# Patient Record
Sex: FEMALE | Race: WHITE
[De-identification: names, ages, dates, MRNs, and addresses within clinical notes are randomized per-mention and may not be internally consistent; named-entity substitution may affect disease eponyms.]

---

## 2017-01-13 ENCOUNTER — HOSPITAL ENCOUNTER (OUTPATIENT)
Age: 67
Discharge: ANOTHER HOSPITAL | End: 2017-01-13
Payer: MEDICARE

## 2017-01-13 DIAGNOSIS — M54.9: Primary | ICD-10-CM

## 2017-03-06 ENCOUNTER — HOSPITAL ENCOUNTER (OUTPATIENT)
Age: 67
Discharge: HOME | End: 2017-03-06
Payer: MEDICARE

## 2017-03-06 DIAGNOSIS — J44.9: ICD-10-CM

## 2017-03-06 DIAGNOSIS — E78.5: Primary | ICD-10-CM

## 2017-03-06 DIAGNOSIS — I25.10: ICD-10-CM

## 2017-04-21 ENCOUNTER — HOSPITAL ENCOUNTER (OUTPATIENT)
Age: 67
Discharge: HOME | End: 2017-04-21
Payer: MEDICARE

## 2017-04-21 DIAGNOSIS — M16.12: Primary | ICD-10-CM

## 2017-06-06 ENCOUNTER — HOSPITAL ENCOUNTER (OUTPATIENT)
Dept: HOSPITAL 76 - LAB.F | Age: 67
Discharge: HOME | End: 2017-06-06
Attending: NURSE PRACTITIONER
Payer: MEDICARE

## 2017-06-06 DIAGNOSIS — R74.8: Primary | ICD-10-CM

## 2017-06-06 LAB
BASOPHILS NFR BLD AUTO: 0.1 10^3/UL (ref 0–0.1)
BASOPHILS NFR BLD AUTO: 0.8 %
BILIRUB BLD-MCNC: 0.5 MG/DL (ref 0.2–1)
BILIRUB DIRECT SERPL-MCNC: 0.1 MG/DL (ref 0.1–0.5)
EOSINOPHIL # BLD AUTO: 0.2 10^3/UL (ref 0–0.7)
EOSINOPHIL NFR BLD AUTO: 2.5 %
ERYTHROCYTE [DISTWIDTH] IN BLOOD BY AUTOMATED COUNT: 13.9 % (ref 12–15)
GLOBULIN SER-MCNC: 3 G/DL (ref 2.1–4.2)
HCT VFR BLD AUTO: 46.4 % (ref 37–47)
HGB UR QL STRIP: 15.7 G/DL (ref 12–16)
LYMPHOCYTES # SPEC AUTO: 2.3 10^3/UL (ref 1.5–3.5)
LYMPHOCYTES NFR BLD AUTO: 28.2 %
MCH RBC QN AUTO: 31.3 PG (ref 27–31)
MCHC RBC AUTO-ENTMCNC: 33.9 G/DL (ref 32–36)
MCV RBC AUTO: 92.1 FL (ref 81–99)
MONOCYTES # BLD AUTO: 0.6 10^3/UL (ref 0–1)
MONOCYTES NFR BLD AUTO: 7.1 %
NEUTROPHILS # BLD AUTO: 5 10^3/UL (ref 1.5–6.6)
NEUTROPHILS # SNV AUTO: 8.1 X10^3/UL (ref 4.8–10.8)
NEUTROPHILS NFR BLD AUTO: 61.4 %
NRBC # BLD AUTO: 0 /100WBC
PDW BLD AUTO: 8.7 FL (ref 7.9–10.8)
PROT SPEC-MCNC: 6.6 G/DL (ref 6.7–8.2)
RBC MAR: 5.04 10^6/UL (ref 4.2–5.4)
WBC # BLD: 8.1 X10^3/UL

## 2017-06-06 PROCEDURE — 80076 HEPATIC FUNCTION PANEL: CPT

## 2017-06-06 PROCEDURE — 36415 COLL VENOUS BLD VENIPUNCTURE: CPT

## 2017-06-06 PROCEDURE — 85025 COMPLETE CBC W/AUTO DIFF WBC: CPT

## 2017-08-14 ENCOUNTER — HOSPITAL ENCOUNTER (OUTPATIENT)
Dept: HOSPITAL 76 - LAB.S | Age: 67
Discharge: HOME | End: 2017-08-14
Attending: NURSE PRACTITIONER
Payer: MEDICARE

## 2017-08-14 DIAGNOSIS — R74.8: Primary | ICD-10-CM

## 2017-08-14 LAB
BILIRUB BLD-MCNC: 0.7 MG/DL (ref 0.2–1)
BILIRUB DIRECT SERPL-MCNC: 0.1 MG/DL (ref 0.1–0.5)
GLOBULIN SER-MCNC: 3.1 G/DL (ref 2.1–4.2)
PROT SPEC-MCNC: 6.7 G/DL (ref 6.7–8.2)

## 2017-08-14 PROCEDURE — 36415 COLL VENOUS BLD VENIPUNCTURE: CPT

## 2017-08-14 PROCEDURE — 80076 HEPATIC FUNCTION PANEL: CPT

## 2017-10-10 ENCOUNTER — HOSPITAL ENCOUNTER (OUTPATIENT)
Dept: HOSPITAL 76 - LAB.F | Age: 67
Discharge: HOME | End: 2017-10-10
Attending: NURSE PRACTITIONER
Payer: MEDICARE

## 2017-10-10 DIAGNOSIS — R74.8: Primary | ICD-10-CM

## 2017-10-10 DIAGNOSIS — Z13.21: ICD-10-CM

## 2017-10-10 DIAGNOSIS — Z79.899: ICD-10-CM

## 2017-10-10 DIAGNOSIS — R20.2: ICD-10-CM

## 2017-10-10 DIAGNOSIS — I10: ICD-10-CM

## 2017-10-10 DIAGNOSIS — E78.5: ICD-10-CM

## 2017-10-10 LAB
ANION GAP SERPL CALCULATED.4IONS-SCNC: 5 MMOL/L (ref 6–13)
BILIRUB BLD-MCNC: 0.6 MG/DL (ref 0.2–1)
BILIRUB DIRECT SERPL-MCNC: 0.1 MG/DL (ref 0.1–0.5)
BUN SERPL-MCNC: 13 MG/DL (ref 6–20)
CALCIUM UR-MCNC: 9.8 MG/DL (ref 8.5–10.3)
CHLORIDE SERPL-SCNC: 107 MMOL/L (ref 101–111)
CO2 SERPL-SCNC: 28 MMOL/L (ref 21–32)
CREAT SERPLBLD-SCNC: 0.8 MG/DL (ref 0.4–1)
GFRSERPLBLD MDRD-ARVRAT: 72 ML/MIN/{1.73_M2} (ref 89–?)
GLOBULIN SER-MCNC: 3.1 G/DL (ref 2.1–4.2)
GLUCOSE SERPL-MCNC: 109 MG/DL (ref 70–100)
POTASSIUM SERPL-SCNC: 4 MMOL/L (ref 3.5–5)
PROT SPEC-MCNC: 6.6 G/DL (ref 6.7–8.2)
SODIUM SERPLBLD-SCNC: 140 MMOL/L (ref 135–145)

## 2017-10-10 PROCEDURE — 82607 VITAMIN B-12: CPT

## 2017-10-10 PROCEDURE — 80076 HEPATIC FUNCTION PANEL: CPT

## 2017-10-10 PROCEDURE — 80048 BASIC METABOLIC PNL TOTAL CA: CPT

## 2017-10-10 PROCEDURE — 36415 COLL VENOUS BLD VENIPUNCTURE: CPT

## 2018-01-29 ENCOUNTER — HOSPITAL ENCOUNTER (OUTPATIENT)
Dept: HOSPITAL 76 - LAB.R | Age: 68
Discharge: HOME | End: 2018-01-29
Attending: NURSE PRACTITIONER
Payer: MEDICARE

## 2018-01-29 DIAGNOSIS — Z79.891: Primary | ICD-10-CM

## 2018-01-29 PROCEDURE — 80361 OPIATES 1 OR MORE: CPT

## 2018-01-29 PROCEDURE — 81599 UNLISTED MAAA: CPT

## 2018-01-29 PROCEDURE — 80307 DRUG TEST PRSMV CHEM ANLYZR: CPT

## 2018-01-29 PROCEDURE — 80365 DRUG SCREENING OXYCODONE: CPT

## 2018-05-15 ENCOUNTER — HOSPITAL ENCOUNTER (OUTPATIENT)
Dept: HOSPITAL 76 - LAB.F | Age: 68
Discharge: HOME | End: 2018-05-15
Attending: NURSE PRACTITIONER
Payer: MEDICARE

## 2018-05-15 DIAGNOSIS — I10: Primary | ICD-10-CM

## 2018-05-15 LAB
ANION GAP SERPL CALCULATED.4IONS-SCNC: 6 MMOL/L (ref 6–13)
BUN SERPL-MCNC: 13 MG/DL (ref 6–20)
CALCIUM UR-MCNC: 9.6 MG/DL (ref 8.5–10.3)
CHLORIDE SERPL-SCNC: 107 MMOL/L (ref 101–111)
CO2 SERPL-SCNC: 27 MMOL/L (ref 21–32)
CREAT SERPLBLD-SCNC: 0.7 MG/DL (ref 0.4–1)
GFRSERPLBLD MDRD-ARVRAT: 83 ML/MIN/{1.73_M2} (ref 89–?)
GLUCOSE SERPL-MCNC: 105 MG/DL (ref 70–100)
SODIUM SERPLBLD-SCNC: 140 MMOL/L (ref 135–145)

## 2018-05-15 PROCEDURE — 80048 BASIC METABOLIC PNL TOTAL CA: CPT

## 2018-05-15 PROCEDURE — 36415 COLL VENOUS BLD VENIPUNCTURE: CPT

## 2018-08-20 ENCOUNTER — HOSPITAL ENCOUNTER (OUTPATIENT)
Dept: HOSPITAL 76 - LAB.S | Age: 68
Discharge: HOME | End: 2018-08-20
Attending: NURSE PRACTITIONER
Payer: MEDICARE

## 2018-08-20 DIAGNOSIS — I25.10: Primary | ICD-10-CM

## 2018-08-20 LAB
CHOLEST SERPL-MCNC: 149 MG/DL
HDLC SERPL-MCNC: 29 MG/DL
HDLC SERPL: 5.1 {RATIO} (ref ?–4.4)
LDLC SERPL CALC-MCNC: 90 MG/DL
LDLC/HDLC SERPL: 3.1 {RATIO} (ref ?–4.4)
VLDLC SERPL-SCNC: 30 MG/DL

## 2018-08-20 PROCEDURE — 80061 LIPID PANEL: CPT

## 2018-08-20 PROCEDURE — 36415 COLL VENOUS BLD VENIPUNCTURE: CPT

## 2018-08-20 PROCEDURE — 83721 ASSAY OF BLOOD LIPOPROTEIN: CPT

## 2018-10-08 ENCOUNTER — HOSPITAL ENCOUNTER (OUTPATIENT)
Dept: HOSPITAL 76 - LAB.R | Age: 68
End: 2018-10-08
Attending: NURSE PRACTITIONER
Payer: MEDICARE

## 2018-10-08 DIAGNOSIS — Z79.891: Primary | ICD-10-CM

## 2018-10-08 LAB
AMPHET UR QL SCN: NEGATIVE
BENZODIAZ UR QL SCN: NEGATIVE
COCAINE UR-SCNC: NEGATIVE UMOL/L
METHADONE UR QL SCN: NEGATIVE
METHAMPHET UR QL SCN: NEGATIVE
OPIATES UR QL SCN: NEGATIVE
VOLATILE DRUGS POS SERPL SCN: (no result)

## 2018-10-08 PROCEDURE — 80306 DRUG TEST PRSMV INSTRMNT: CPT

## 2019-01-14 ENCOUNTER — HOSPITAL ENCOUNTER (OUTPATIENT)
Dept: HOSPITAL 76 - DI | Age: 69
Discharge: HOME | End: 2019-01-14
Attending: NURSE PRACTITIONER
Payer: MEDICARE

## 2019-01-14 DIAGNOSIS — M54.5: ICD-10-CM

## 2019-01-14 DIAGNOSIS — M16.12: Primary | ICD-10-CM

## 2019-01-14 DIAGNOSIS — G89.29: ICD-10-CM

## 2019-01-14 DIAGNOSIS — Z97.8: ICD-10-CM

## 2019-01-15 NOTE — XRAY REPORT
Reason:  LOW BACK PAIN,CHRONIC,HIP PAIN,LEFT,PRESENCE OF OT

Procedure Date:  01/14/2019   

Accession Number:  [id number] / [id number]                    

Procedure:  XR  - Hip w/Pelvis 2-3V LT CPT Code:  

 

FULL RESULT:

 

 

EXAM:

LEFT HIP AND PELVIS RADIOGRAPHY

 

EXAM DATE: 1/14/2019 03:50 PM.

 

HISTORY: LOW BACK PAIN,CHRONIC,HIP PAIN,LEFT,PRESENCE OF OT.

 

COMPARISONS: HIP W/PELVIS 2-3V LT 04/21/2017 2:32 PM.

 

TECHNIQUE: 1 view of the pelvis and 1 view of the hip.

 

FINDINGS:

Bones: Normal. No fracture or bone lesion.

 

Joints: Stable osteoarthritis of the left hip.

 

Soft Tissues: Spinal stimulator pack overlying the right hip, stable.

IMPRESSION: Stable osteoarthritis of the left hip.

 

[facility]

## 2019-02-18 ENCOUNTER — HOSPITAL ENCOUNTER (OUTPATIENT)
Dept: HOSPITAL 76 - LAB.R | Age: 69
Discharge: HOME | End: 2019-02-18
Attending: NURSE PRACTITIONER
Payer: MEDICARE

## 2019-02-18 DIAGNOSIS — I25.10: ICD-10-CM

## 2019-02-18 DIAGNOSIS — E78.5: Primary | ICD-10-CM

## 2019-02-18 LAB
ALBUMIN DIAFP-MCNC: 3.6 G/DL (ref 3.2–5.5)
ALBUMIN/GLOB SERPL: 1.1 {RATIO} (ref 1–2.2)
ALP SERPL-CCNC: 112 IU/L (ref 42–121)
ALT SERPL W P-5'-P-CCNC: 31 IU/L (ref 10–60)
ANION GAP SERPL CALCULATED.4IONS-SCNC: 8 MMOL/L (ref 6–13)
AST SERPL W P-5'-P-CCNC: 26 IU/L (ref 10–42)
BILIRUB BLD-MCNC: 0.8 MG/DL (ref 0.2–1)
BUN SERPL-MCNC: 13 MG/DL (ref 6–20)
CALCIUM UR-MCNC: 10 MG/DL (ref 8.5–10.3)
CHLORIDE SERPL-SCNC: 108 MMOL/L (ref 101–111)
CO2 SERPL-SCNC: 27 MMOL/L (ref 21–32)
CREAT SERPLBLD-SCNC: 0.8 MG/DL (ref 0.4–1)
GFRSERPLBLD MDRD-ARVRAT: 71 ML/MIN/{1.73_M2} (ref 89–?)
GLOBULIN SER-MCNC: 3.2 G/DL (ref 2.1–4.2)
GLUCOSE SERPL-MCNC: 114 MG/DL (ref 70–100)
PROT SPEC-MCNC: 6.8 G/DL (ref 6.7–8.2)
SODIUM SERPLBLD-SCNC: 143 MMOL/L (ref 135–145)

## 2019-02-18 PROCEDURE — 80053 COMPREHEN METABOLIC PANEL: CPT

## 2019-07-02 ENCOUNTER — HOSPITAL ENCOUNTER (OUTPATIENT)
Dept: HOSPITAL 76 - DI | Age: 69
Discharge: HOME | End: 2019-07-02
Attending: NURSE PRACTITIONER
Payer: MEDICARE

## 2019-07-02 DIAGNOSIS — Z12.2: Primary | ICD-10-CM

## 2019-07-02 DIAGNOSIS — F17.210: ICD-10-CM

## 2019-07-03 NOTE — CT REPORT
Reason:  TOBACCO USE DISORDER

Procedure Date:  07/02/2019   

Accession Number:  [id number] / [id number]                    

Procedure:  CT  - Low Dose Lung Cancer Screen CPT Code:  

 

FULL RESULT:

 

 

EXAM

CT LUNG SCREEN

 

EXAM DATE: 7/2/2019 12:04 PM.

 

HISTORY: 68-year-old patient with 55-pack-year smoking history. Currently 

smoking: Yes. Years since quitting: None.

 

COMPARISON: CHEST W/O 07/05/2016 11:05 AM

CHEST W/O 01/02/2016 11:31 AM.

 

TECHNIQUE: CT examination of the entire thorax without contrast was 

performed using low-dose technique. Thin section coronal, axial, sagittal 

and MIP axial images were obtained.

 

In accordance with CT protocol optimization, one or more of the following 

dose reduction techniques were utilized for this exam: automated exposure 

control, adjustment of mA and/or KV based on patient size, or use of 

iterative reconstructive technique.

 

FINDINGS:

 

Nodules:

Right upper lobe: 3 mm peripheral (image 46, series 4), stable.

Right middle lobe: 9 x 7 mm anterior superior (image 81, series 4), 

stable.

Right lower lobe: 2 mm superior/perifissural (image 75, series 4), stable.

Left upper lobe: Posterior subpleural 5 mm (image 44, series 4), stable.

Left lower lobe: 4 mm peripheral basilar subpleural (image 116, series 

4), stable.

 

Emphysema: Mild emphysematous changes.

Pleura: No pleural thickening. No pleural effusions.

Aorta: Calcified plaque. No aneurysm.

Mediastinum: Heart size normal. Visualized thyroid gland is unremarkable. 

No enlarged mediastinal or hilar lymph nodes. Diffuse fluid distention of 

the majority of the esophagus. Hiatal hernia present.

Coronary calcifications: Moderate severe coronary artery calcified plaque.

Other pulmonary findings: Lower lobe parenchymal cyst. Groundglass 

opacities in the peripheral left upper lobe and posterior left upper lobe 

are with minimal change and appear in part centrilobular.

Other extrapulmonary findings: Changes seen from gastric band surgery. 

Included portions of the liver, gallbladder, spleen, adrenals, pancreas 

and kidneys and stomach and upper abdominal bowel unremarkable. Abdominal 

aorta calcified plaque noted.

 

Degenerative changes of the thoracic spine. Epidural lead is noted.

IMPRESSION:

Lung-RADS ASSESSMENT CATEGORY: 3 - probably benign.

 

Probability of malignancy: 1-2%.

 

RECOMMENDATION:

Continued low-dose chest CT followup in 6 months recommended.

 

[facility]

## 2020-01-07 ENCOUNTER — HOSPITAL ENCOUNTER (OUTPATIENT)
Dept: HOSPITAL 76 - DI | Age: 70
Discharge: HOME | End: 2020-01-07
Attending: NURSE PRACTITIONER
Payer: MEDICARE

## 2020-01-07 DIAGNOSIS — R91.8: ICD-10-CM

## 2020-01-07 DIAGNOSIS — J43.9: Primary | ICD-10-CM

## 2020-01-07 PROCEDURE — 71250 CT THORAX DX C-: CPT

## 2020-01-07 NOTE — CT REPORT
Reason:  EMPHYSEMA MILD, PULMONARY N ODULES

Procedure Date:  01/07/2020   

Accession Number:  [id number] / [id number]                    

Procedure:  CT  - CHEST WO CPT Code:  

 

***Final Report***

 

 

FULL RESULT:

 

 

EXAM: CT CHEST

 

EXAM DATE: 1/7/2020 11:09 AM.

 

CLINICAL HISTORY: EMPHYSEMA MILD, PULMONARY NODULES.

 

COMPARISONS: CHEST SCREEN LOW DOSE W/O 07/02/2019 12:01 PM.

 

TECHNIQUE: Routine helical CT imaging was performed through the chest. IV 

contrast: None. Reconstructions: Coronal and sagittal.

 

In accordance with CT protocol optimization, one or more of the following 

dose reduction techniques were utilized for this exam: automated exposure 

control, adjustment of mA and/or KV based on patient size, or use of 

iterative reconstructive technique.

 

FINDINGS:

 

Lungs/Pleura: A 7 mm right perifissural nodule (image 182, series 4). A 3 

mm nodule in posterior left upper lobe (image 92, series 4). A 3 mm 

nodule in right upper lobe (image 103, series 4). A solitary bulla in 

left lower lobe. Mild centrilobular emphysema.

 

No bronchial thickening, consolidation, or edema. Pulmonary vasculature 

is normal. No pericardial or pleural effusion. No pneumothorax.

 

Mediastinum: A moderate hiatal hernia in posterior mediastinum. A few 

subcentimeter lymph nodes in mediastinum.The heart and great vessels are 

normal. A moderate hiatal hernia in posterior mediastinum.

 

Bones: Unremarkable.

 

Visualized Abdomen: Lap banding device in left upper abdomen.

 

Other: None.

IMPRESSION: Unchanged nodules since prior dated 7/2/2019:A 7 mm right 

perifissural nodule, 3 mm nodules in posterior left upper lobe and right 

upper lobe.Benign appearing nodules.

 

[facility]

## 2020-04-18 ENCOUNTER — HOSPITAL ENCOUNTER (EMERGENCY)
Dept: HOSPITAL 76 - ED | Age: 70
Discharge: HOME | End: 2020-04-18
Payer: MEDICARE

## 2020-04-18 VITALS — SYSTOLIC BLOOD PRESSURE: 131 MMHG | DIASTOLIC BLOOD PRESSURE: 67 MMHG
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
## 2020-04-18 DIAGNOSIS — S61.411A: Primary | ICD-10-CM
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
## 2020-04-18 PROCEDURE — 90471 IMMUNIZATION ADMIN: CPT

## 2020-04-18 PROCEDURE — 99283 EMERGENCY DEPT VISIT LOW MDM: CPT

## 2020-04-18 PROCEDURE — 90715 TDAP VACCINE 7 YRS/> IM: CPT

## 2020-04-18 RX ADMIN — TETANUS TOXOID, REDUCED DIPHTHERIA TOXOID AND ACELLULAR PERTUSSIS VACCINE, ADSORBED ONE ML: 5; 2.5; 8; 8; 2.5 SUSPENSION INTRAMUSCULAR at 16:59

## 2020-04-18 RX ADMIN — AMOXICILLIN AND CLAVULANATE POTASSIUM STA TAB: 875; 125 TABLET, FILM COATED ORAL at 16:59

## 2020-04-18 NOTE — ED PHYSICIAN DOCUMENTATION
PD HPI UPPER EXT INJURY





- Stated complaint


Stated Complaint: RT HAND BITE





- Chief complaint


Chief Complaint: Laceration





- History obtained from


History obtained from: Patient (69-year-old woman with unknown tetanus status.  

Her own healthy and fully immunized dog bit her on accident to the right hand at

home just prior to arrival.)





Review of Systems


Constitutional: denies: Fever, Chills


Cardiac: reports: Reviewed and negative


Respiratory: reports: Reviewed and negative





PD PAST MEDICAL HISTORY





- Present Medications


Home Medications: 


                                Ambulatory Orders











 Medication  Instructions  Recorded  Confirmed


 


Losartan [Cozaar] 25 mg PO DAILY 01/28/15 02/11/15


 


Omeprazole 20 mg PO DAILY 01/28/15 02/11/15


 


Varenicline Tartrate [Chantix] 1 mg PO DAILY 01/28/15 02/11/15


 


atenoloL [Atenolol] 100 mg PO DAILY 01/28/15 02/11/15


 


oxyCODONE [Roxicodone] 5 mg PO 5XD 01/28/15 02/11/15


 


Amox/Clav 875/125 [Augmentin] 1 each PO Q12H #14 tablet 04/18/20 














- Allergies


Allergies/Adverse Reactions: 


                                    Allergies











Allergy/AdvReac Type Severity Reaction Status Date / Time


 


No Known Drug Allergies Allergy   Verified 04/18/20 16:45














PD ED PE NORMAL





- Vitals


Vital signs reviewed: Yes





- General


General: Alert and oriented X 3, No acute distress





- Extremities


Extremities: Other (There is a single puncture wound measuring less than a 

centimeter just lateral and proximal on the side of the hand near the second 

MCP.  She is good interosseous strength and normal neurovascular status in the 

second digit.)





- Neuro


Neuro: Alert and oriented X 3, Normal speech





Results





- Vitals


Vitals: 





                               Vital Signs - 24 hr











  04/18/20





  16:42


 


Temperature 36.6 C


 


Heart Rate 67


 


Respiratory 18





Rate 


 


Blood Pressure 131/67 H


 


O2 Saturation 97








                                     Oxygen











O2 Source                      Room air

















Procedures





- Laceration (location)


  ** R hand


Length in cm: 1


Wound type: Linear, Into subcut fat


Wound Preparation: Irrigated copiously NS


Skin layer closure: Steri strips


Other: Tetanus booster given


Complexity: Simple





Departure





- Departure


Disposition: 01 Home, Self Care


Clinical Impression: 


Dog bite


Qualifiers:


 Encounter type: initial encounter Qualified Code(s): W54.0XXA - Bitten by dog, 

initial encounter





Condition: Good


Record reviewed to determine appropriate education?: Yes


Instructions:  ED Bite Animal General


Prescriptions: 


Amox/Clav 875/125 [Augmentin] 1 each PO Q12H #14 tablet


Comments: 


Come back for any signs of infection which would include: Redness, swelling, 

drainage, increased pain, or fevers.


You can wash it soap and water.

## 2020-12-14 ENCOUNTER — HOSPITAL ENCOUNTER (OUTPATIENT)
Dept: HOSPITAL 76 - DI | Age: 70
Discharge: HOME | End: 2020-12-14
Attending: NURSE PRACTITIONER
Payer: MEDICARE

## 2020-12-14 DIAGNOSIS — N64.4: Primary | ICD-10-CM

## 2020-12-15 NOTE — MAMMOGRAPHY REPORT
BILATERAL DIGITAL DIAGNOSTIC MAMMOGRAM 3D/2D: 12/14/2020

 

CLINICAL: Occasional left breast/nipple pain.  

 

Comparison is made to exams dated:  12/4/2014 mammogram and 4/8/2010 mammogram - [facility].  The tissue of both breasts is predominantly fatty.  

 

No significant masses, calcifications, or other findings are seen in either breast.  

There has been no significant interval change.

 

IMPRESSION: NEGATIVE

There is no mammographic evidence of malignancy. Return to annual mammogram screening schedule is rec
ommended.  

 

 

This exam was interpreted at Station ID: [id number].  

 

NOTE: For mammograms, a report in lay terms will be sent to the patient. Approximately 15% of breast 
malignancies will not be visualized mammographically. In the management of a palpable breast mass, a 
negative mammogram must not discourage biopsy of a clinically suspicious lesion.

 

Electronically Signed By: Malik Moore M.D., jr/penrad:12/14/2020 11:59:56  

 

 

 

ACR BI-RADS Category 1: Negative 3341F

PARENCHYMAL PATTERN: (F) - The breast(s) demonstrate(s) diffuse fatty replacement.

BI-RADS CATEGORY: (1) - 1

RECOMMENDATION: (ANNUAL)  - Recommend routine annual screening mammography.

[id number]

return to screening

LATERALITY: (B)

## 2021-01-20 ENCOUNTER — HOSPITAL ENCOUNTER (OUTPATIENT)
Dept: HOSPITAL 76 - LAB.S | Age: 71
Discharge: HOME | End: 2021-01-20
Attending: NURSE PRACTITIONER
Payer: MEDICARE

## 2021-01-20 DIAGNOSIS — E66.01: ICD-10-CM

## 2021-01-20 DIAGNOSIS — E78.5: ICD-10-CM

## 2021-01-20 DIAGNOSIS — G47.33: ICD-10-CM

## 2021-01-20 DIAGNOSIS — I10: ICD-10-CM

## 2021-01-20 DIAGNOSIS — F17.209: ICD-10-CM

## 2021-01-20 DIAGNOSIS — K76.0: Primary | ICD-10-CM

## 2021-01-20 LAB
ALBUMIN DIAFP-MCNC: 3.7 G/DL (ref 3.2–5.5)
ALBUMIN/GLOB SERPL: 1.2 {RATIO} (ref 1–2.2)
ALP SERPL-CCNC: 83 IU/L (ref 42–121)
ALT SERPL W P-5'-P-CCNC: 47 IU/L (ref 10–60)
ANION GAP SERPL CALCULATED.4IONS-SCNC: 7 MMOL/L (ref 6–13)
AST SERPL W P-5'-P-CCNC: 33 IU/L (ref 10–42)
BASOPHILS NFR BLD AUTO: 0.1 10^3/UL (ref 0–0.1)
BASOPHILS NFR BLD AUTO: 0.8 %
BILIRUB BLD-MCNC: 0.6 MG/DL (ref 0.2–1)
BUN SERPL-MCNC: 21 MG/DL (ref 6–20)
CALCIUM UR-MCNC: 10.1 MG/DL (ref 8.5–10.3)
CHLORIDE SERPL-SCNC: 104 MMOL/L (ref 101–111)
CHOLEST SERPL-MCNC: 113 MG/DL
CO2 SERPL-SCNC: 27 MMOL/L (ref 21–32)
CREAT SERPLBLD-SCNC: 0.8 MG/DL (ref 0.4–1)
EOSINOPHIL # BLD AUTO: 0.1 10^3/UL (ref 0–0.7)
EOSINOPHIL NFR BLD AUTO: 1.7 %
ERYTHROCYTE [DISTWIDTH] IN BLOOD BY AUTOMATED COUNT: 15.7 % (ref 12–15)
GLOBULIN SER-MCNC: 3 G/DL (ref 2.1–4.2)
GLUCOSE SERPL-MCNC: 107 MG/DL (ref 70–100)
HDLC SERPL-MCNC: 35 MG/DL
HDLC SERPL: 3.2 {RATIO} (ref ?–4.4)
HGB UR QL STRIP: 15.3 G/DL (ref 12–16)
LDLC SERPL CALC-MCNC: 51 MG/DL
LDLC/HDLC SERPL: 1.5 {RATIO} (ref ?–4.4)
LYMPHOCYTES # SPEC AUTO: 1.9 10^3/UL (ref 1.5–3.5)
LYMPHOCYTES NFR BLD AUTO: 29.1 %
MCH RBC QN AUTO: 29.4 PG (ref 27–31)
MCHC RBC AUTO-ENTMCNC: 31.4 G/DL (ref 32–36)
MCV RBC AUTO: 93.5 FL (ref 81–99)
MONOCYTES # BLD AUTO: 0.6 10^3/UL (ref 0–1)
MONOCYTES NFR BLD AUTO: 8.7 %
NEUTROPHILS # BLD AUTO: 3.8 10^3/UL (ref 1.5–6.6)
NEUTROPHILS # SNV AUTO: 6.4 X10^3/UL (ref 4.8–10.8)
NEUTROPHILS NFR BLD AUTO: 59.5 %
PDW BLD AUTO: 10.7 FL (ref 7.9–10.8)
PLATELET # BLD: 246 10^3/UL (ref 130–450)
PROT SPEC-MCNC: 6.7 G/DL (ref 6.7–8.2)
RBC MAR: 5.21 10^6/UL (ref 4.2–5.4)
SODIUM SERPLBLD-SCNC: 138 MMOL/L (ref 135–145)
VLDLC SERPL-SCNC: 27 MG/DL

## 2021-01-20 PROCEDURE — 80053 COMPREHEN METABOLIC PANEL: CPT

## 2021-01-20 PROCEDURE — 85025 COMPLETE CBC W/AUTO DIFF WBC: CPT

## 2021-01-20 PROCEDURE — 84443 ASSAY THYROID STIM HORMONE: CPT

## 2021-01-20 PROCEDURE — 80061 LIPID PANEL: CPT

## 2021-01-20 PROCEDURE — 36415 COLL VENOUS BLD VENIPUNCTURE: CPT

## 2021-01-20 PROCEDURE — 83721 ASSAY OF BLOOD LIPOPROTEIN: CPT

## 2021-02-03 ENCOUNTER — HOSPITAL ENCOUNTER (OUTPATIENT)
Dept: HOSPITAL 76 - DI | Age: 71
Discharge: HOME | End: 2021-02-03
Attending: NURSE PRACTITIONER
Payer: MEDICARE

## 2021-02-03 DIAGNOSIS — I71.4: Primary | ICD-10-CM

## 2021-02-03 PROCEDURE — 71275 CT ANGIOGRAPHY CHEST: CPT

## 2021-02-03 NOTE — CT REPORT
PROCEDURE:  ANGIO CHEST W/WO

 

INDICATIONS:  HISTORY OF AAA

 

TECHNIQUE:  

After the administration of intravenous contrast, 2 mm thick sections acquired from the pulmonary api
mike to the posterior costophrenic angles.  3-dimensional maximum intensity projection (MIP) coronal a
nd sagittal reformats were then acquired through the thorax. For radiation dose reduction, the follow
ing was used:  automated exposure control, adjustment of mA and/or kV according to patient size. 

 

COMPARISON:  CT chest 1/7/2020, 7/5/2016, CT abdomen pelvis 12/23/2011

 

FINDINGS:  

Image quality:  Excellent.  

 

Pulmonary arteries:  Pulmonary arteries are normal in size, and demonstrate no intraluminal filling d
efects to suggest central pulmonary embolism.  

 

Lungs and pleura: 7 mm. Fissural nodule on series 7 image 171 is unchanged. 3 mm right upper lobe nod
ule is also stable. Emphysematous changes are present. Emphysematous bleb is present in the left lowe
r lobe. No pleural effusions or pneumothorax.  Central and peripheral airways are patent.  

 

Mediastinum:  Heart size is enlarged, without pericardial effusion.  No mediastinal or hilar adenopat
hy.  Thoracic aorta measures 32 mm in the descending portion, unchanged. Limited, visualized portions
 of the upper abdominal aorta demonstrate prominent areas of mural thrombus. At the level of the kidn
eys, mural thrombus causes approximately 50% narrowing of the opacified lumen. Esophagus is fluid-xochitl
led with prominent hernia. Gastric bypass changes are noted. 

 

Bones and chest wall:  No suspicious bony lesions.  Ribs and thoracic spine appear intact throughout.
  Unchanged low-attenuation left thyroid lobe focus with calcification.  No axillary or supraclavicul
ar adenopathy.  

 

Abdomen:  Visualized upper abdominal solid organs appear normal in the early arterial phase of enhanc
ement.  

 

IMPRESSION:  

 

1. No gross aneurysmal dilation.

 

 

2. Real-time mural thrombus is identified in the distal descending thoracic aorta and more prominentl
y within the abdominal aorta at the level of the renals with luminal narrowing. Prior exams are witho
ut contrast, which limits direct comparison.

 

Reviewed by: Malini Burch MD on 2/3/2021 2:35 PM PST

Approved by: Malini Burch MD on 2/3/2021 2:35 PM PST

 

 

Station ID:  [id number]

## 2021-06-24 ENCOUNTER — HOSPITAL ENCOUNTER (OUTPATIENT)
Dept: HOSPITAL 76 - LAB.S | Age: 71
Discharge: HOME | End: 2021-06-24
Payer: MEDICARE

## 2021-06-24 DIAGNOSIS — Z00.00: Primary | ICD-10-CM

## 2021-06-24 DIAGNOSIS — R79.89: ICD-10-CM

## 2021-06-24 DIAGNOSIS — I10: ICD-10-CM

## 2021-06-24 DIAGNOSIS — K21.9: ICD-10-CM

## 2021-06-24 DIAGNOSIS — E66.9: ICD-10-CM

## 2021-06-24 DIAGNOSIS — E78.5: ICD-10-CM

## 2021-06-24 LAB
ALBUMIN DIAFP-MCNC: 3.8 G/DL (ref 3.2–5.5)
ALBUMIN/GLOB SERPL: 1.3 {RATIO} (ref 1–2.2)
ALP SERPL-CCNC: 84 IU/L (ref 42–121)
ALT SERPL W P-5'-P-CCNC: 39 IU/L (ref 10–60)
ANION GAP SERPL CALCULATED.4IONS-SCNC: 8 MMOL/L (ref 6–13)
AST SERPL W P-5'-P-CCNC: 35 IU/L (ref 10–42)
BILIRUB BLD-MCNC: 0.5 MG/DL (ref 0.2–1)
BUN SERPL-MCNC: 18 MG/DL (ref 6–20)
CALCIUM UR-MCNC: 10 MG/DL (ref 8.5–10.3)
CHLORIDE SERPL-SCNC: 104 MMOL/L (ref 101–111)
CHOLEST SERPL-MCNC: 111 MG/DL
CO2 SERPL-SCNC: 27 MMOL/L (ref 21–32)
CREAT SERPLBLD-SCNC: 0.8 MG/DL (ref 0.4–1)
ERYTHROCYTE [DISTWIDTH] IN BLOOD BY AUTOMATED COUNT: 17.2 % (ref 12–15)
EST. AVERAGE GLUCOSE BLD GHB EST-MCNC: 126 MG/DL (ref 70–100)
GFRSERPLBLD MDRD-ARVRAT: 71 ML/MIN/{1.73_M2} (ref 89–?)
GLOBULIN SER-MCNC: 3 G/DL (ref 2.1–4.2)
GLUCOSE SERPL-MCNC: 108 MG/DL (ref 70–100)
HBA1C MFR BLD HPLC: 6 % (ref 4.27–6.07)
HCT VFR BLD AUTO: 52.3 % (ref 37–47)
HDLC SERPL-MCNC: 34 MG/DL
HDLC SERPL: 3.3 {RATIO} (ref ?–4.4)
HGB UR QL STRIP: 16.7 G/DL (ref 12–16)
LDLC SERPL CALC-MCNC: 51 MG/DL
LDLC/HDLC SERPL: 1.5 {RATIO} (ref ?–4.4)
MCH RBC QN AUTO: 29.1 PG (ref 27–31)
MCHC RBC AUTO-ENTMCNC: 31.9 G/DL (ref 32–36)
MCV RBC AUTO: 91.3 FL (ref 81–99)
NEUTROPHILS # SNV AUTO: 6 X10^3/UL (ref 4.8–10.8)
PDW BLD AUTO: 11.1 FL (ref 7.9–10.8)
PLATELET # BLD: 210 10^3/UL (ref 130–450)
POTASSIUM SERPL-SCNC: 4.2 MMOL/L (ref 3.5–5)
PROT SPEC-MCNC: 6.8 G/DL (ref 6.7–8.2)
RBC MAR: 5.73 10^6/UL (ref 4.2–5.4)
SODIUM SERPLBLD-SCNC: 139 MMOL/L (ref 135–145)
TRIGL P FAST SERPL-MCNC: 130 MG/DL
VLDLC SERPL-SCNC: 26 MG/DL

## 2021-06-24 PROCEDURE — 85027 COMPLETE CBC AUTOMATED: CPT

## 2021-06-24 PROCEDURE — 80061 LIPID PANEL: CPT

## 2021-06-24 PROCEDURE — 36415 COLL VENOUS BLD VENIPUNCTURE: CPT

## 2021-06-24 PROCEDURE — 82607 VITAMIN B-12: CPT

## 2021-06-24 PROCEDURE — 80053 COMPREHEN METABOLIC PANEL: CPT

## 2021-06-24 PROCEDURE — 86803 HEPATITIS C AB TEST: CPT

## 2021-06-24 PROCEDURE — 83036 HEMOGLOBIN GLYCOSYLATED A1C: CPT

## 2021-06-24 PROCEDURE — 83721 ASSAY OF BLOOD LIPOPROTEIN: CPT

## 2021-06-25 LAB
HEPATITIS C ANTIBODY: (no result)
SIGNAL TO CUT-OFF: 0 (ref ?–1)